# Patient Record
Sex: MALE | Race: WHITE | URBAN - METROPOLITAN AREA
[De-identification: names, ages, dates, MRNs, and addresses within clinical notes are randomized per-mention and may not be internally consistent; named-entity substitution may affect disease eponyms.]

---

## 2024-11-08 ENCOUNTER — CLAIMS CREATED FROM THE CLAIM WINDOW (OUTPATIENT)
Dept: URBAN - METROPOLITAN AREA MEDICAL CENTER 40 | Facility: MEDICAL CENTER | Age: 74
End: 2024-11-08
Payer: MEDICARE

## 2024-11-08 DIAGNOSIS — R19.5 OTHER FECAL ABNORMALITIES: ICD-10-CM

## 2024-11-08 DIAGNOSIS — D50.9 IRON DEFICIENCY ANEMIA, UNSPECIFIED: ICD-10-CM

## 2024-11-08 PROCEDURE — 43235 EGD DIAGNOSTIC BRUSH WASH: CPT | Performed by: INTERNAL MEDICINE

## 2024-11-08 PROCEDURE — 99222 1ST HOSP IP/OBS MODERATE 55: CPT | Performed by: INTERNAL MEDICINE

## 2024-11-08 PROCEDURE — 99254 IP/OBS CNSLTJ NEW/EST MOD 60: CPT | Performed by: INTERNAL MEDICINE

## 2024-11-19 PROBLEM — 271737000: Status: ACTIVE | Noted: 2024-11-19

## 2024-11-20 ENCOUNTER — OFFICE VISIT (OUTPATIENT)
Dept: URBAN - METROPOLITAN AREA CLINIC 72 | Facility: CLINIC | Age: 74
End: 2024-11-20
Payer: MEDICARE

## 2024-11-20 ENCOUNTER — DASHBOARD ENCOUNTERS (OUTPATIENT)
Age: 74
End: 2024-11-20

## 2024-11-20 ENCOUNTER — LAB OUTSIDE AN ENCOUNTER (OUTPATIENT)
Dept: URBAN - METROPOLITAN AREA CLINIC 72 | Facility: CLINIC | Age: 74
End: 2024-11-20

## 2024-11-20 VITALS
BODY MASS INDEX: 27.75 KG/M2 | HEIGHT: 67 IN | HEART RATE: 72 BPM | WEIGHT: 176.8 LBS | SYSTOLIC BLOOD PRESSURE: 130 MMHG | OXYGEN SATURATION: 98 % | TEMPERATURE: 98.8 F | DIASTOLIC BLOOD PRESSURE: 85 MMHG

## 2024-11-20 DIAGNOSIS — D64.9 ANEMIA, UNSPECIFIED TYPE: ICD-10-CM

## 2024-11-20 PROCEDURE — 99204 OFFICE O/P NEW MOD 45 MIN: CPT

## 2024-11-20 RX ORDER — SILDENAFIL 100 MG/1
TAKE 1 TABLET BY MOUTH EVERY DAY TABLET, FILM COATED ORAL
Qty: 20 EACH | Refills: 1 | Status: ON HOLD | COMMUNITY

## 2024-11-20 RX ORDER — SERTRALINE HYDROCHLORIDE 50 MG/1
TABLET ORAL
Qty: 30 TABLET | Status: ON HOLD | COMMUNITY

## 2024-11-20 RX ORDER — DULOXETINE 30 MG/1
CAPSULE, DELAYED RELEASE ORAL
Qty: 60 CAPSULE | Status: ON HOLD | COMMUNITY

## 2024-11-20 RX ORDER — POTASSIUM CITRATE 10 MEQ/1
TABLET, EXTENDED RELEASE ORAL
Qty: 180 TABLET | Status: ON HOLD | COMMUNITY

## 2024-11-20 RX ORDER — DICLOFENAC SODIUM 75 MG/1
TABLET, DELAYED RELEASE ORAL
Qty: 60 TABLET | Status: ON HOLD | COMMUNITY

## 2024-11-20 RX ORDER — TELMISARTAN 80 MG/1
TAKE 1 TABLET BY MOUTH EVERY DAY TABLET ORAL
Qty: 90 EACH | Refills: 1 | Status: ACTIVE | COMMUNITY

## 2024-11-20 NOTE — HPI-TODAY'S VISIT:
Patient is a 74-year-old male here for colon consult and hospital follow-up.  Patient was admitted to hospital for dizziness found to be anemic and hypokalemic.  Patient's hemoglobin was 5.9 on arrival.  Fecal occult stool was positive, but not melenic stools.  Patient is seen in the office today. He states that he has a schedule lab draw with PCP (Pako Bates on Monday). He did receive 1 unit PRBC and Fe infusion in the hospital. He is feeling good now.  Denies melena or hemtemesis. For 2-3 months, his wife noticed he was pale. He was experiencing some dizziness and palpatations, but he was also on potassium citrate for high uric acid.   Last colonoscopy was at age 68 years. NO history of polyps.   BM's daily - soft formed stools. No abdominal pain. No red blood or black stools.   Procedures: 11/8/2024: EGD - Normal esophagus.  Squamocolumnar junction appeared regular located 36 cm from incisors.  Normal stomach.  No evidence of bleeding anywhere in the upper GI tract.  Duodenum normal.  No pathology.

## 2024-11-22 ENCOUNTER — OFFICE VISIT (OUTPATIENT)
Dept: URBAN - METROPOLITAN AREA MEDICAL CENTER 40 | Facility: MEDICAL CENTER | Age: 74
End: 2024-11-22

## 2024-12-12 PROBLEM — 247479008: Status: ACTIVE | Noted: 2024-12-12

## 2024-12-13 ENCOUNTER — OFFICE VISIT (OUTPATIENT)
Dept: URBAN - METROPOLITAN AREA CLINIC 72 | Facility: CLINIC | Age: 74
End: 2024-12-13

## 2024-12-13 RX ORDER — SERTRALINE HYDROCHLORIDE 50 MG/1
TABLET ORAL
Qty: 30 TABLET | Status: ON HOLD | COMMUNITY

## 2024-12-13 RX ORDER — TELMISARTAN 80 MG/1
TAKE 1 TABLET BY MOUTH EVERY DAY TABLET ORAL
Qty: 90 EACH | Refills: 1 | Status: ACTIVE | COMMUNITY

## 2024-12-13 RX ORDER — DULOXETINE 30 MG/1
CAPSULE, DELAYED RELEASE ORAL
Qty: 60 CAPSULE | Status: ON HOLD | COMMUNITY

## 2024-12-13 RX ORDER — SILDENAFIL 100 MG/1
TAKE 1 TABLET BY MOUTH EVERY DAY TABLET, FILM COATED ORAL
Qty: 20 EACH | Refills: 1 | Status: ON HOLD | COMMUNITY

## 2024-12-13 RX ORDER — DICLOFENAC SODIUM 75 MG/1
TABLET, DELAYED RELEASE ORAL
Qty: 60 TABLET | Status: ON HOLD | COMMUNITY

## 2024-12-13 RX ORDER — POTASSIUM CITRATE 10 MEQ/1
TABLET, EXTENDED RELEASE ORAL
Qty: 180 TABLET | Status: ON HOLD | COMMUNITY

## 2024-12-13 NOTE — HPI-TODAY'S VISIT:
Patient is a 74-year-old male last seen in the office on 11/20/2024 for hospital follow-up for anemia.  Patient was scheduled for colonoscopy and he is here today for his 2-week follow-up.  Patient was asked to reduce CBC, iron panel, B12 and folate prior to his colonoscopy.  When he was admitted to the hospital his hemoglobin was 5.9.  I do not see most current labs that would have been reported after 11/20/2024.

## 2024-12-13 NOTE — HPI-OTHER HISTORIES
Procedures: Colonoscopy-11/22/2024 moderate diverticulosis from sigmoid to splenic flexure. 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 No intervention.  EGD-11/8/2024 normal esophagus. Squamocolumnar junction appeared regular located 36 cm from incisors. Normal stomach. Normal duodenum. No evidence of bleeding anywhere in the upper GI tract.

## 2024-12-18 ENCOUNTER — TELEPHONE ENCOUNTER (OUTPATIENT)
Dept: URBAN - METROPOLITAN AREA CLINIC 72 | Facility: CLINIC | Age: 74
End: 2024-12-18

## 2024-12-19 ENCOUNTER — OFFICE VISIT (OUTPATIENT)
Dept: URBAN - METROPOLITAN AREA CLINIC 72 | Facility: CLINIC | Age: 74
End: 2024-12-19
Payer: MEDICARE

## 2024-12-19 VITALS
DIASTOLIC BLOOD PRESSURE: 85 MMHG | HEART RATE: 62 BPM | HEIGHT: 67 IN | BODY MASS INDEX: 27.09 KG/M2 | WEIGHT: 172.6 LBS | SYSTOLIC BLOOD PRESSURE: 166 MMHG | TEMPERATURE: 97.7 F

## 2024-12-19 DIAGNOSIS — D64.9 ANEMIA, UNSPECIFIED TYPE: ICD-10-CM

## 2024-12-19 DIAGNOSIS — I78.1 TELANGIECTASIAS: ICD-10-CM

## 2024-12-19 PROCEDURE — 99213 OFFICE O/P EST LOW 20 MIN: CPT

## 2024-12-19 PROCEDURE — 99214 OFFICE O/P EST MOD 30 MIN: CPT

## 2024-12-19 RX ORDER — SILDENAFIL 100 MG/1
TAKE 1 TABLET BY MOUTH EVERY DAY TABLET, FILM COATED ORAL
Qty: 20 EACH | Refills: 1 | Status: ON HOLD | COMMUNITY

## 2024-12-19 RX ORDER — DULOXETINE 30 MG/1
CAPSULE, DELAYED RELEASE ORAL
Qty: 60 CAPSULE | Status: ON HOLD | COMMUNITY

## 2024-12-19 RX ORDER — POTASSIUM CITRATE 10 MEQ/1
TABLET, EXTENDED RELEASE ORAL
Qty: 180 TABLET | Status: ON HOLD | COMMUNITY

## 2024-12-19 RX ORDER — SERTRALINE HYDROCHLORIDE 50 MG/1
TABLET ORAL
Qty: 30 TABLET | Status: ON HOLD | COMMUNITY

## 2024-12-19 RX ORDER — TELMISARTAN 80 MG/1
TAKE 1 TABLET BY MOUTH EVERY DAY TABLET ORAL
Qty: 90 EACH | Refills: 1 | Status: ACTIVE | COMMUNITY

## 2024-12-19 RX ORDER — DICLOFENAC SODIUM 75 MG/1
TABLET, DELAYED RELEASE ORAL
Qty: 60 TABLET | Status: ON HOLD | COMMUNITY

## 2024-12-19 NOTE — HPI-OTHER HISTORIES
Procedures: Colonoscopy-11/22/2024 moderate diverticulosis from sigmoid to splenic flexure. 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 No intervention.  EGD-11/8/2024 normal esophagus. Squamocolumnar junction appeared regular located 36 cm from incisors. Normal stomach. Normal duodenum. No evidence of bleeding anywhere in the upper GI tract.  Labs: 12/10/2024-WBC 5.0, RBC 4.36, hemoglobin 10.3, hematocrit 35.1, MCV 80.5, platelets 396,  chloride 105,  , K4, BUN 28, creatinine 1.14, total bili 0.4, ALP 58, AST 21, ALT 30

## 2024-12-19 NOTE — HPI-TODAY'S VISIT:
Patient is a 74-year-old male last seen in the office on 11/20/2024 as a hospital follow-up for severe anemia.  He had an EGD done on 11/8/2024 which did not show any bleeding. Colonoscopy was performed and telangiectasia was found no source of bleed.  Iron labs, vitamin B12, and folic acid were not performed. Patient has started Geritol daily.   No SOB, NO melena. Pateint is back to gym three times weekly. Energy level is up. Has blood work schedule in 3 months with PCP.